# Patient Record
Sex: MALE | Race: WHITE | NOT HISPANIC OR LATINO | Employment: OTHER | URBAN - METROPOLITAN AREA
[De-identification: names, ages, dates, MRNs, and addresses within clinical notes are randomized per-mention and may not be internally consistent; named-entity substitution may affect disease eponyms.]

---

## 2017-07-06 ENCOUNTER — PREPPED CHART (OUTPATIENT)
Dept: URBAN - METROPOLITAN AREA CLINIC 43 | Facility: CLINIC | Age: 62
End: 2017-07-06

## 2018-07-12 ENCOUNTER — ESTABLISHED COMPREHENSIVE EXAM (OUTPATIENT)
Dept: URBAN - METROPOLITAN AREA CLINIC 43 | Facility: CLINIC | Age: 63
End: 2018-07-12

## 2018-07-12 DIAGNOSIS — H00.023: ICD-10-CM

## 2018-07-12 DIAGNOSIS — H25.9: ICD-10-CM

## 2018-07-12 DIAGNOSIS — H43.813: ICD-10-CM

## 2018-07-12 DIAGNOSIS — H00.026: ICD-10-CM

## 2018-07-12 PROCEDURE — 92015 DETERMINE REFRACTIVE STATE: CPT

## 2018-07-12 PROCEDURE — 92014 COMPRE OPH EXAM EST PT 1/>: CPT

## 2018-07-12 ASSESSMENT — VISUAL ACUITY
OS_BAT: 20/40
OS_SC: 5/200
OD_SC: 5/200
OD_BAT: 20/30
OD_CC: 20/20-2
OS_CC: 20/20-3
OD_CC: J1

## 2018-07-12 ASSESSMENT — TONOMETRY
OD_IOP_MMHG: 16
OS_IOP_MMHG: 16

## 2019-07-12 ENCOUNTER — ESTABLISHED COMPREHENSIVE EXAM (OUTPATIENT)
Dept: URBAN - METROPOLITAN AREA CLINIC 43 | Facility: CLINIC | Age: 64
End: 2019-07-12

## 2019-07-12 DIAGNOSIS — H02.883: ICD-10-CM

## 2019-07-12 DIAGNOSIS — H25.9: ICD-10-CM

## 2019-07-12 DIAGNOSIS — H02.886: ICD-10-CM

## 2019-07-12 DIAGNOSIS — H52.03: ICD-10-CM

## 2019-07-12 DIAGNOSIS — H43.813: ICD-10-CM

## 2019-07-12 PROCEDURE — 92014 COMPRE OPH EXAM EST PT 1/>: CPT

## 2019-07-12 PROCEDURE — 92310-1 LEVEL 1 CONTACT LENS MANAGEMENT

## 2019-07-12 PROCEDURE — 92015 DETERMINE REFRACTIVE STATE: CPT

## 2019-07-12 ASSESSMENT — VISUAL ACUITY
OD_CC: J2-
OD_CC: 20/20-3
OS_SC: >J12
OS_CC: 20/20-3
OD_SC: >J12
OS_CC: J1
OS_SC: 5/200
OD_SC: 5/200

## 2019-07-12 ASSESSMENT — TONOMETRY
OD_IOP_MMHG: 14
OS_IOP_MMHG: 14

## 2020-02-28 NOTE — PATIENT DISCUSSION
Continue topical antibiotic QHS x 4 nights then D/C, stressed importance of not rubbing eyes, do not submerge head under water for 1 more week and do not apply eye make up for 1 more week.

## 2020-07-17 ENCOUNTER — ESTABLISHED COMPREHENSIVE EXAM (OUTPATIENT)
Dept: URBAN - METROPOLITAN AREA CLINIC 43 | Facility: CLINIC | Age: 65
End: 2020-07-17

## 2020-07-17 DIAGNOSIS — H02.886: ICD-10-CM

## 2020-07-17 DIAGNOSIS — H25.9: ICD-10-CM

## 2020-07-17 DIAGNOSIS — H52.03: ICD-10-CM

## 2020-07-17 DIAGNOSIS — H02.883: ICD-10-CM

## 2020-07-17 DIAGNOSIS — H43.813: ICD-10-CM

## 2020-07-17 PROCEDURE — 92015 DETERMINE REFRACTIVE STATE: CPT

## 2020-07-17 PROCEDURE — 92014 COMPRE OPH EXAM EST PT 1/>: CPT

## 2020-07-17 ASSESSMENT — VISUAL ACUITY
OS_SC: 5/200
OS_SC: >J12
OD_SC: 5/200
OD_CC: J1
OS_CC: J1
OD_CC: 20/20-3
OS_CC: 20/20
OD_SC: >J12

## 2020-07-17 ASSESSMENT — TONOMETRY
OD_IOP_MMHG: 14
OS_IOP_MMHG: 14

## 2021-08-25 ENCOUNTER — ESTABLISHED COMPREHENSIVE EXAM (OUTPATIENT)
Dept: URBAN - METROPOLITAN AREA CLINIC 43 | Facility: CLINIC | Age: 66
End: 2021-08-25

## 2021-08-25 DIAGNOSIS — H43.813: ICD-10-CM

## 2021-08-25 DIAGNOSIS — H52.03: ICD-10-CM

## 2021-08-25 DIAGNOSIS — H25.9: ICD-10-CM

## 2021-08-25 DIAGNOSIS — H02.883: ICD-10-CM

## 2021-08-25 DIAGNOSIS — H02.886: ICD-10-CM

## 2021-08-25 PROCEDURE — 92014 COMPRE OPH EXAM EST PT 1/>: CPT

## 2021-08-25 PROCEDURE — 92015 DETERMINE REFRACTIVE STATE: CPT

## 2021-08-25 ASSESSMENT — VISUAL ACUITY
OD_CC: J1
OS_CC: 20/20
OS_SC: >J12
OS_SC: 5/200
OD_SC: >J12
OD_CC: 20/25
OD_SC: 5/200
OS_CC: J1

## 2021-08-25 ASSESSMENT — TONOMETRY
OS_IOP_MMHG: 14
OD_IOP_MMHG: 14

## 2022-10-14 ENCOUNTER — COMPREHENSIVE EXAM (OUTPATIENT)
Dept: URBAN - METROPOLITAN AREA CLINIC 43 | Facility: CLINIC | Age: 67
End: 2022-10-14

## 2022-10-14 DIAGNOSIS — H43.813: ICD-10-CM

## 2022-10-14 DIAGNOSIS — H02.886: ICD-10-CM

## 2022-10-14 DIAGNOSIS — H52.03: ICD-10-CM

## 2022-10-14 DIAGNOSIS — H25.9: ICD-10-CM

## 2022-10-14 DIAGNOSIS — H02.883: ICD-10-CM

## 2022-10-14 PROCEDURE — 92014 COMPRE OPH EXAM EST PT 1/>: CPT

## 2022-10-14 PROCEDURE — 92015 DETERMINE REFRACTIVE STATE: CPT

## 2022-10-14 ASSESSMENT — TONOMETRY
OS_IOP_MMHG: 14
OD_IOP_MMHG: 14

## 2022-10-14 ASSESSMENT — VISUAL ACUITY
OS_CC: J1
OD_SC: >J12
OD_SC: 5/200
OD_CC: 20/30+2
OS_SC: 5/200
OD_CC: J1-
OS_CC: 20/20
OS_SC: >J12

## 2023-09-05 ENCOUNTER — COMPREHENSIVE EXAM (OUTPATIENT)
Dept: URBAN - METROPOLITAN AREA CLINIC 43 | Facility: CLINIC | Age: 68
End: 2023-09-05

## 2023-09-05 DIAGNOSIS — H52.03: ICD-10-CM

## 2023-09-05 DIAGNOSIS — H25.9: ICD-10-CM

## 2023-09-05 DIAGNOSIS — H02.886: ICD-10-CM

## 2023-09-05 DIAGNOSIS — H43.813: ICD-10-CM

## 2023-09-05 DIAGNOSIS — H02.883: ICD-10-CM

## 2023-09-05 PROCEDURE — 92015 DETERMINE REFRACTIVE STATE: CPT

## 2023-09-05 PROCEDURE — 92014 COMPRE OPH EXAM EST PT 1/>: CPT

## 2023-09-05 ASSESSMENT — TONOMETRY
OS_IOP_MMHG: 16
OD_IOP_MMHG: 15

## 2023-09-05 ASSESSMENT — VISUAL ACUITY
OS_CC: 20/25-2
OS_SC: >J12
OD_CC: 20/20
OS_CC: J1
OD_SC: >J12
OS_BAT: 20/80
OD_BAT: 20/80
OD_CC: J3
OS_SC: 5/200
OD_SC: 5/200

## 2024-09-23 ENCOUNTER — COMPREHENSIVE EXAM (OUTPATIENT)
Dept: URBAN - METROPOLITAN AREA CLINIC 43 | Facility: CLINIC | Age: 69
End: 2024-09-23

## 2024-09-23 DIAGNOSIS — H25.9: ICD-10-CM

## 2024-09-23 DIAGNOSIS — H43.813: ICD-10-CM

## 2024-09-23 DIAGNOSIS — H52.03: ICD-10-CM

## 2024-09-23 DIAGNOSIS — H02.883: ICD-10-CM

## 2024-09-23 DIAGNOSIS — H02.886: ICD-10-CM

## 2024-09-23 PROCEDURE — 92014 COMPRE OPH EXAM EST PT 1/>: CPT

## 2024-09-23 PROCEDURE — 92015 DETERMINE REFRACTIVE STATE: CPT
